# Patient Record
Sex: MALE | Race: OTHER | HISPANIC OR LATINO | URBAN - METROPOLITAN AREA
[De-identification: names, ages, dates, MRNs, and addresses within clinical notes are randomized per-mention and may not be internally consistent; named-entity substitution may affect disease eponyms.]

---

## 2021-05-26 ENCOUNTER — EMERGENCY (EMERGENCY)
Facility: HOSPITAL | Age: 21
LOS: 1 days | Discharge: HOME | End: 2021-05-26
Attending: INTERNAL MEDICINE | Admitting: INTERNAL MEDICINE
Payer: MEDICAID

## 2021-05-26 VITALS
WEIGHT: 184.97 LBS | DIASTOLIC BLOOD PRESSURE: 96 MMHG | OXYGEN SATURATION: 98 % | RESPIRATION RATE: 18 BRPM | HEIGHT: 75 IN | TEMPERATURE: 98 F | SYSTOLIC BLOOD PRESSURE: 144 MMHG | HEART RATE: 98 BPM

## 2021-05-26 LAB
ALBUMIN SERPL ELPH-MCNC: 4.8 G/DL — SIGNIFICANT CHANGE UP (ref 3.3–5)
ALP SERPL-CCNC: 137 U/L — HIGH (ref 40–120)
ALT FLD-CCNC: 37 U/L — SIGNIFICANT CHANGE UP (ref 10–45)
ANION GAP SERPL CALC-SCNC: 9 MMOL/L — SIGNIFICANT CHANGE UP (ref 5–17)
APPEARANCE UR: CLEAR — SIGNIFICANT CHANGE UP
AST SERPL-CCNC: 22 U/L — SIGNIFICANT CHANGE UP (ref 10–40)
BACTERIA # UR AUTO: ABNORMAL /HPF
BASOPHILS # BLD AUTO: 0.04 K/UL — SIGNIFICANT CHANGE UP (ref 0–0.2)
BASOPHILS NFR BLD AUTO: 0.6 % — SIGNIFICANT CHANGE UP (ref 0–2)
BILIRUB SERPL-MCNC: 0.4 MG/DL — SIGNIFICANT CHANGE UP (ref 0.2–1.2)
BILIRUB UR-MCNC: NEGATIVE — SIGNIFICANT CHANGE UP
BUN SERPL-MCNC: 18 MG/DL — SIGNIFICANT CHANGE UP (ref 7–23)
CALCIUM SERPL-MCNC: 9.4 MG/DL — SIGNIFICANT CHANGE UP (ref 8.4–10.5)
CHLORIDE SERPL-SCNC: 106 MMOL/L — SIGNIFICANT CHANGE UP (ref 96–108)
CO2 SERPL-SCNC: 29 MMOL/L — SIGNIFICANT CHANGE UP (ref 22–31)
COLOR SPEC: YELLOW — SIGNIFICANT CHANGE UP
CREAT SERPL-MCNC: 0.98 MG/DL — SIGNIFICANT CHANGE UP (ref 0.5–1.3)
DIFF PNL FLD: ABNORMAL
EOSINOPHIL # BLD AUTO: 0.22 K/UL — SIGNIFICANT CHANGE UP (ref 0–0.5)
EOSINOPHIL NFR BLD AUTO: 3.1 % — SIGNIFICANT CHANGE UP (ref 0–6)
EPI CELLS # UR: SIGNIFICANT CHANGE UP
GLUCOSE SERPL-MCNC: 105 MG/DL — HIGH (ref 70–99)
GLUCOSE UR QL: NEGATIVE — SIGNIFICANT CHANGE UP
HCT VFR BLD CALC: 48.8 % — SIGNIFICANT CHANGE UP (ref 39–50)
HGB BLD-MCNC: 17.3 G/DL — HIGH (ref 13–17)
IMM GRANULOCYTES NFR BLD AUTO: 0.1 % — SIGNIFICANT CHANGE UP (ref 0–1.5)
KETONES UR-MCNC: NEGATIVE — SIGNIFICANT CHANGE UP
LEUKOCYTE ESTERASE UR-ACNC: NEGATIVE — SIGNIFICANT CHANGE UP
LYMPHOCYTES # BLD AUTO: 2.95 K/UL — SIGNIFICANT CHANGE UP (ref 1–3.3)
LYMPHOCYTES # BLD AUTO: 41.7 % — SIGNIFICANT CHANGE UP (ref 13–44)
MCHC RBC-ENTMCNC: 30.6 PG — SIGNIFICANT CHANGE UP (ref 27–34)
MCHC RBC-ENTMCNC: 35.5 GM/DL — SIGNIFICANT CHANGE UP (ref 32–36)
MCV RBC AUTO: 86.2 FL — SIGNIFICANT CHANGE UP (ref 80–100)
MONOCYTES # BLD AUTO: 0.66 K/UL — SIGNIFICANT CHANGE UP (ref 0–0.9)
MONOCYTES NFR BLD AUTO: 9.3 % — SIGNIFICANT CHANGE UP (ref 2–14)
NEUTROPHILS # BLD AUTO: 3.2 K/UL — SIGNIFICANT CHANGE UP (ref 1.8–7.4)
NEUTROPHILS NFR BLD AUTO: 45.2 % — SIGNIFICANT CHANGE UP (ref 43–77)
NITRITE UR-MCNC: NEGATIVE — SIGNIFICANT CHANGE UP
NRBC # BLD: 0 /100 WBCS — SIGNIFICANT CHANGE UP (ref 0–0)
PH UR: 6 — SIGNIFICANT CHANGE UP (ref 5–8)
PLATELET # BLD AUTO: 193 K/UL — SIGNIFICANT CHANGE UP (ref 150–400)
POTASSIUM SERPL-MCNC: 4.4 MMOL/L — SIGNIFICANT CHANGE UP (ref 3.5–5.3)
POTASSIUM SERPL-SCNC: 4.4 MMOL/L — SIGNIFICANT CHANGE UP (ref 3.5–5.3)
PROT SERPL-MCNC: 9.3 G/DL — HIGH (ref 6–8.3)
PROT UR-MCNC: 30 MG/DL
RBC # BLD: 5.66 M/UL — SIGNIFICANT CHANGE UP (ref 4.2–5.8)
RBC # FLD: 12.1 % — SIGNIFICANT CHANGE UP (ref 10.3–14.5)
RBC CASTS # UR COMP ASSIST: SIGNIFICANT CHANGE UP /HPF (ref 0–4)
SODIUM SERPL-SCNC: 144 MMOL/L — SIGNIFICANT CHANGE UP (ref 135–145)
SP GR SPEC: 1.02 — SIGNIFICANT CHANGE UP (ref 1.01–1.02)
UROBILINOGEN FLD QL: NEGATIVE — SIGNIFICANT CHANGE UP
WBC # BLD: 7.08 K/UL — SIGNIFICANT CHANGE UP (ref 3.8–10.5)
WBC # FLD AUTO: 7.08 K/UL — SIGNIFICANT CHANGE UP (ref 3.8–10.5)
WBC UR QL: SIGNIFICANT CHANGE UP /HPF (ref 0–5)

## 2021-05-26 PROCEDURE — 85025 COMPLETE CBC W/AUTO DIFF WBC: CPT

## 2021-05-26 PROCEDURE — 87591 N.GONORRHOEAE DNA AMP PROB: CPT

## 2021-05-26 PROCEDURE — 99285 EMERGENCY DEPT VISIT HI MDM: CPT

## 2021-05-26 PROCEDURE — 81001 URINALYSIS AUTO W/SCOPE: CPT

## 2021-05-26 PROCEDURE — 80053 COMPREHEN METABOLIC PANEL: CPT

## 2021-05-26 PROCEDURE — 36415 COLL VENOUS BLD VENIPUNCTURE: CPT

## 2021-05-26 PROCEDURE — 76870 US EXAM SCROTUM: CPT

## 2021-05-26 PROCEDURE — 99284 EMERGENCY DEPT VISIT MOD MDM: CPT | Mod: 25

## 2021-05-26 PROCEDURE — 76870 US EXAM SCROTUM: CPT | Mod: 26

## 2021-05-26 RX ORDER — IBUPROFEN 200 MG
600 TABLET ORAL ONCE
Refills: 0 | Status: DISCONTINUED | OUTPATIENT
Start: 2021-05-26 | End: 2021-05-29
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
## 2021-05-26 NOTE — ED PROVIDER NOTE - CARE PROVIDER_API CALL
Derrick Avina  UROLOGY  [address] [place] [address], [address]  [place], [place] [id number]  Phone: [phone]  Fax: [phone]  Follow Up Time:
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
## 2021-05-26 NOTE — ED PROVIDER NOTE - NSFOLLOWUPINSTRUCTIONS_ED_ALL_ED_FT
Rest, drink plenty of fluids  Advance activity as tolerated  Continue all previously prescribed medications as directed  Follow up with your PMD 2-3 days- bring copies of your results  Return to the ER for worsening  motrin 200 mg 3 tab every six h for pain

## 2021-05-26 NOTE — ED PROVIDER NOTE - PATIENT PORTAL LINK FT
You can access the FollowMyHealth Patient Portal offered by [facility] by registering at the following website: [email or web address]. By joining [employer]’s FollowMyHealth portal, you will also be able to view your health information using other applications (apps) compatible with our system.

## 2021-05-27 LAB
N GONORRHOEA RRNA SPEC QL NAA+PROBE: SIGNIFICANT CHANGE UP
SPECIMEN SOURCE: SIGNIFICANT CHANGE UP

## 2021-08-18 ENCOUNTER — EMERGENCY (EMERGENCY)
Facility: HOSPITAL | Age: 21
LOS: 1 days | Discharge: HOME | End: 2021-08-18
Attending: EMERGENCY MEDICINE | Admitting: EMERGENCY MEDICINE
Payer: MEDICAID

## 2021-08-18 VITALS
SYSTOLIC BLOOD PRESSURE: 125 MMHG | RESPIRATION RATE: 17 BRPM | OXYGEN SATURATION: 100 % | WEIGHT: 181 LBS | TEMPERATURE: 98 F | HEIGHT: 75 IN | HEART RATE: 80 BPM | DIASTOLIC BLOOD PRESSURE: 68 MMHG

## 2021-08-18 PROBLEM — Z78.9 OTHER SPECIFIED HEALTH STATUS: Chronic | Status: ACTIVE | Noted: 2021-05-26

## 2021-08-18 LAB

## 2021-08-18 PROCEDURE — 99283 EMERGENCY DEPT VISIT LOW MDM: CPT

## 2021-08-18 PROCEDURE — 99284 EMERGENCY DEPT VISIT MOD MDM: CPT

## 2021-08-18 PROCEDURE — 0225U NFCT DS DNA&RNA 21 SARSCOV2: CPT

## 2021-08-18 PROCEDURE — 87651 STREP A DNA AMP PROBE: CPT

## 2021-08-18 PROCEDURE — 87798 DETECT AGENT NOS DNA AMP: CPT

## 2021-08-18 RX ORDER — IBUPROFEN 200 MG
600 TABLET ORAL ONCE
Refills: 0 | Status: COMPLETED | OUTPATIENT
Start: 2021-08-18 | End: 2021-08-18

## 2021-08-18 RX ORDER — FLUTICASONE PROPIONATE 50 MCG
1 SPRAY, SUSPENSION NASAL
Qty: 1 | Refills: 0
Start: 2021-08-18 | End: 2021-08-24

## 2021-08-18 RX ADMIN — Medication 600 MILLIGRAM(S): at 13:00

## 2021-08-18 RX ADMIN — Medication 600 MILLIGRAM(S): at 12:37

## 2021-08-18 NOTE — ED PROVIDER NOTE - THROAT FINDINGS
+ cobblestoning posterior pharynx with mild erythema. no abscess./no exudate/uvula midline/NO VESICLES/ULCERS/NO DROOLING/NO TONGUE ELEVATION/NO STRIDOR

## 2021-08-18 NOTE — ED PROVIDER NOTE - NSFOLLOWUPCLINICS_GEN_ALL_ED_FT
[facility]  Family Medicine  [address] [place] [id number]  Phone: [phone]  Fax:   Follow Up Time: 1-3 Days

## 2021-08-18 NOTE — ED PROVIDER NOTE - CLINICAL SUMMARY MEDICAL DECISION MAKING FREE TEXT BOX
21 yo male with no significant pmh presents to the ED c/o nasal congestion, sore throat and sinus pressure x 4 days. no known sick contacts. did not get covid vaccine. Denies fever, chills, change in voice, excessive drooling, ear pain, chest pain, sob, cough, abd pain, N/V, UE/LE weakness or paresthesias. nonsmoker. Patient didn't try taking any medications for these symptoms. PE: as above. A/P: covid/rvp swab, strep pcr, motrin, reassess. Juarez: 21 yo male with no significant pmh presents to the ED c/o nasal congestion, sore throat and sinus pressure x 4 days. no known sick contacts. did not get covid vaccine. Denies fever, chills, change in voice, excessive drooling, ear pain, chest pain, sob, cough, abd pain, N/V, UE/LE weakness or paresthesias. nonsmoker. Patient didn't try taking any medications for these symptoms. PE: as above. A/P: covid/rvp swab, strep pcr, motrin, reassess.

## 2021-08-18 NOTE — ED PROVIDER NOTE - PATIENT PORTAL LINK FT
You can access the FollowMyHealth Patient Portal offered by [facility] by registering at the following website: [email or web address]. By joining [employer]’s FollowMyHealth portal, you will also be able to view your health information using other applications (apps) compatible with our system.

## 2021-08-18 NOTE — ED ADULT NURSE NOTE - NSICDXPASTMEDICALHX_GEN_ALL_CORE_FT
PAST MEDICAL HISTORY:  No pertinent past medical history      PAST MEDICAL HISTORY:  No pertinent past medical history

## 2021-08-18 NOTE — ED PROVIDER NOTE - CARDIAC, MLM
Normal rate, regular rhythm.  Heart sounds S1, S2.  No murmurs, rubs or gallops.
pt reports is right handed

## 2021-08-18 NOTE — ED PROVIDER NOTE - OBJECTIVE STATEMENT
21 yo male with no significant pmh presents to the ED c/o nasal congestion, sore throat and sinus pressure x 4 days. no known sick contacts. did not get covid vaccine. Denies fever, chills, change in voice, excessive drooling, ear pain, chest pain, sob, cough, abd pain, N/V, UE/LE weakness or paresthesias. nonsmoker. Patient didn't try taking any medications for these symptoms.

## 2021-12-09 ENCOUNTER — EMERGENCY (EMERGENCY)
Facility: HOSPITAL | Age: 21
LOS: 1 days | Discharge: HOME | End: 2021-12-09
Attending: EMERGENCY MEDICINE | Admitting: EMERGENCY MEDICINE
Payer: SELF-PAY

## 2021-12-09 VITALS
SYSTOLIC BLOOD PRESSURE: 130 MMHG | HEIGHT: 72 IN | TEMPERATURE: 98 F | RESPIRATION RATE: 18 BRPM | OXYGEN SATURATION: 100 % | WEIGHT: 199.96 LBS | DIASTOLIC BLOOD PRESSURE: 70 MMHG | HEART RATE: 79 BPM

## 2021-12-09 PROCEDURE — 99283 EMERGENCY DEPT VISIT LOW MDM: CPT | Mod: 25

## 2021-12-09 PROCEDURE — 99284 EMERGENCY DEPT VISIT MOD MDM: CPT

## 2021-12-09 PROCEDURE — 96372 THER/PROPH/DIAG INJ SC/IM: CPT

## 2021-12-09 RX ORDER — LIDOCAINE 4 G/100G
1 CREAM TOPICAL
Qty: 30 | Refills: 0
Start: 2021-12-09 | End: 2022-01-07

## 2021-12-09 RX ORDER — CYCLOBENZAPRINE HYDROCHLORIDE 10 MG/1
1 TABLET, FILM COATED ORAL
Qty: 12 | Refills: 0
Start: 2021-12-09 | End: 2021-12-12

## 2021-12-09 RX ORDER — KETOROLAC TROMETHAMINE 30 MG/ML
30 SYRINGE (ML) INJECTION ONCE
Refills: 0 | Status: DISCONTINUED | OUTPATIENT
Start: 2021-12-09 | End: 2021-12-09

## 2021-12-09 RX ORDER — IBUPROFEN 200 MG
1 TABLET ORAL
Qty: 16 | Refills: 0
Start: 2021-12-09 | End: 2021-12-12

## 2021-12-09 RX ORDER — LIDOCAINE 4 G/100G
1 CREAM TOPICAL ONCE
Refills: 0 | Status: COMPLETED | OUTPATIENT
Start: 2021-12-09 | End: 2021-12-09

## 2021-12-09 RX ADMIN — LIDOCAINE 1 PATCH: 4 CREAM TOPICAL at 13:45

## 2021-12-09 RX ADMIN — Medication 30 MILLIGRAM(S): at 13:48

## 2021-12-09 NOTE — ED PROVIDER NOTE - PATIENT PORTAL LINK FT
You can access the FollowMyHealth Patient Portal offered by [facility] by registering at the following website: [email or web address]. By joining [employer]’s FollowMyHealth portal, you will also be able to view your health information using other applications (apps) compatible with our system.

## 2021-12-09 NOTE — ED PROVIDER NOTE - CLINICAL SUMMARY MEDICAL DECISION MAKING FREE TEXT BOX
Back pain to the left SI joint after "leg day" at the gym.   Leg day was yesterday.   Exam WNL except for spasm and tightness.   No bony tenderness.   No step off. Distal neurovasc intact.   Given NSAID and Lido patch. Discussed with  Markos #[id number].   Patient will take mscle relaxant when he gets home. He is driving.   Worsening, continued or ANY new concerning symptoms return to the emergency department.

## 2021-12-09 NOTE — ED PROVIDER NOTE - ATTENDING CONTRIBUTION TO CARE
Antolin with [facility] Velazquez. Back pain to the left SI joint after "leg day" at the gym.   Leg day was yesterday.   Exam WNL except for spasm and tightness.   No bony tenderness.   No step off. Distal neurovasc intact.   Given NSAID and Lido patch. Discussed with  Markos #[id number].   Patient will take mscle relaxant when he gets home. He is driving.   Worsening, continued or ANY new concerning symptoms return to the emergency department.    I performed a face to face bedside interview with patient regarding history of present illness, review of symptoms and past medical history. I completed an independent physical exam.  I have discussed the patient's plan of care with Physician Assistant (PA). I agree with note as stated above, having amended the EMR as needed to reflect my findings.   This includes History of Present Illness, HIV, Past Medical/Surgical/Family/Social History, Allergies and Home Medications, Review of Systems, Physical Exam, and any Progress Notes during the time I functioned as the attending physician for this patient.

## 2021-12-09 NOTE — ED PROVIDER NOTE - NSFOLLOWUPINSTRUCTIONS_ED_ALL_ED_FT
Distensión muscular    LO QUE NECESITA SABER:    ¿Qué es grant distensión muscular?Grant distensión muscular es grant torcedura, tirón o desgarre de un músculo o tendón. Un tendón es un tejido elástico suresh que conecta un músculo a un hueso.    ¿Qué causa grant distensión muscular?El estirar un músculo demasiado podría provocar grant distensión muscular. Grant distensión también podría ocurrir cuando se usa un músculo demasiado y sin descanso. Las distensiones musculares en la pierna son más comunes en personas que practican deportes, que corren, bailan y practican esquí acuático. Las distensiones en los músculos del abdomen podrían ocurrir al jugar voleyball, tenis, golf, beísbol y al practicar buceo. Las distensiones en la parte inferior de las espalda podrían ocurrir cuando se levantan objetos pesados o al practicar osbaldo kyleigh o gimnasia.    ¿Cuáles son los tipos de distensiones musculares?  •Grant distensión levetambién se le llama distensión de primer mary. [place] es un desgarre de unas cuantas fibras del músculo con un poco de inflamación. Usted podría tener muy poca o ninguna pérdida de fuerza muscular.      •Grant distensión moderadatambién se le llama distensión de edwin mary. Existe más daño en el músculo o en el tendón y está más débil de lo que estaba antes de la lesión.      •Grant distensión severatambién se le llama distensión de tercer mary. Karla desgarre corre por toda la longitud del músculo y lo incapacita para usarlo completamente.      ¿Qué aumenta mi riesgo de grant distensión muscular?  •Edad avanzada      •Fatiga muscular (cansancio)      •No hacer calentamiento antes del ejercicio      •Lesiones musculares pasadas o regresar a las actividades usuales antes de que hopkins lesión se ha curado      •Músculos tiesos, tensos y débiles      •Entrenar por más tiempo y mas lejos del tiempo o distancia usual      •Dificultad con los pies o que las piernas nedra de diferente longitud      ¿Cuáles son los signos y síntomas de grant distensión muscular?Los signos y síntomas de grant distensión muscular dependen de la severidad de la lesión en el músculo. Los signos y síntomas podrían o no aparecer rápidamente cuando ocurre la lesión. Usted podría tener emilia o más de los siguientes:  •Moretones en la piel en el área de la lesión muscular      •Dolor muscular, calambres o espasmos      •Poco movimiento muscular o músculo tieso, o pérdida de fuerza muscular      •Inflamación en el área de la lesión      •Dolor muscular que empeora con la actividad o dolor que se mueve o se extiende a otras áreas del cuerpo      •Crepitación (oleksandr crujiente o sensación de rechinido) al  hopkins músculo.      ¿Cómo se diagnostica grant distensión muscular?Hopkins médico le preguntará sobre enfermedades y lesiones que usted ha tenido en el pasado. Karla podría tocar y poner presión en partes de aston músculos. Podría doblar, estirar o  aston articulaciones de ciertas formas. También se le podría realizar alguno de los siguientes exámenes:  •Radiografía:[place] es grant imagen de los huesos y los tejidos del cuerpo. Las radiografías podrían ser realizadas para asegurarse de que usted no se quebró un hueso cuando ocurrió la distensión muscular.      •Imagen por resonancia magnética:A esta prueba también se le llama IRM. Loyd grant IRM, se teodora imágenes de aston músculos. Un IRM podría usarse para buscar desgarres u otras lesiones musculares. También podrían utilizarse para revisar las articulaciones, huesos o vasos sanguíneos. Es posible que le administren un colorante por la vena antes de que se tomen las imágenes. [place] ayuda a que las partes del cuerpo se van mejor. Informe al médico si usted es alérgico a los mariscos (langosta, cangrejo o camarón). La gente alérgica a los mariscos también podría ser alérgica a algunos colorantes.      •Tomografía computarizada:También llamado un escán TC. Grant máquina de tab x utiliza grant computadora para registrar imágenes de los brazos, piernas, espalda o abdomen. Se utiliza para revisar las lesiones musculares, huesos quebrados y vasos sanguíneos dañados.      •Ultrasonido:El ultrasonido utiliza ondas sonoras para mostrar imágenes de los músculos y tejidos en un monitor.      ¿Qué puedo hacer para ayudar a que la distensión muscular sane?  •De 3 a 7 días después de la lesión:Utilice [place], Hielo, Compresión y Elevación (DHCE) para ayudar a detener los moretones y disminuir el dolor y la inflamación.?Descanse:Descanse el músculo para permitir que la lesión sane. Cuando disminuya el dolor, comience a realizar movimientos normales y lentos. Para distensiones musculares leves y moderadas, usted debe descansar los músculos por lo menos 2 días. Sin embargo, si usted tiene grant distensión muscular severa, el descanso debe ser de 10 a 14 aileen. Es posible que usted necesite muletas para caminar si la distensión muscular está en las piernas o en la parte inferior del cuerpo.      ?Hielo:Coloque grant bolsa de hielo en el área lesionada. Coloque grant toalla entre el paquete de hielo y la piel. No coloque la bolsa de hielo directamente sobre la piel. Usted puede usar un paquete de chícharos congelados en vez de usar grant bolsa de hielo.      ?Compresión:Es posible que usted necesite envolver un vendaje elástico alrededor del área para reducir la inflamación. Estas deben estar lo suficientemente apretadas eleazar para sentir sostén. No lo apriete demasiado.      ?Elevación:Mantenga el músculo lesionado arriba del nivel del corazón si es posible. Por ejemplo, si usted tiene grant distensión en la parte inferior de la pierna, acuéstese y apoye la pierna sobre almohadas. [place] ayuda a disminuir el dolor y la inflamación.      •De 3 a 21 días después de la lesión:Comience lenta y regularmente a ejercitar el músculo con la distensión. [place] ayudará a que sane. Si siente dolor, disminuya la fuerza del ejercicio.      •De 1 a 6 semanas después de la lesión:Estire el músculo lesionado. Estírelo alrededor de 30 segundos. Linda esto 4 veces al día. Usted puede estirar el músculo hasta que sienta un jalón pequeño. Suspenda el estiramiento del músculo si siente dolor.      •De 2 semanas a 6 meses después de la lesión:La meta de esta fase es que usted regrese a la actividad que estaba haciendo antes de que ocurriera la lesión sin lesionar el músculo.      •De 3 semanas a 6 meses después de la lesión:Continúe estirando y fortaleciendo aston músculos para evitar grant lesión. Aumente el tiempo y la distancia del ejercicio lentamente. Usted aún podría tener signos y síntomas de distensión muscular 6 meses después de la lesión, incluso si usted realiza actividades para ayudar a sanarla. En karla lisha, es posible que usted necesite cirugía en el músculo.      ¿Cómo se trata grant distensión muscular?  •Medicamentos:?AINEs (analgésicos antiinflamatorios no esteroides):Karla medicamento es usado para disminuir el dolor y la inflamación. Se puede comprar sin receta. Los AINEs pueden provocar sangrado estomacal o problemas renales si no se teodora correctamente. Lisa siempre las etiquetas y siga las indicaciones antes de usar karla tipo de medicamentos. Usted debe usar karla medicamento solamente por 3 a 7 días después de que la lesión ocurrió.      ?Relajantes muscularesayudan a reducir dolor y espasmos musculares.      ?Medicamentos esteroideos:Hopkins médico podría recomendarle grant inyección con esteroides para disminuir el dolor y la inflamación.      ?Anestesia local:Esta se puede usar para entumecer el área por un periodo de tiempo corto. [place] se usa a menudo si usted tiene grant distensión muscular en la espalda.      •Fisioterapia:Un fisioterapeuta le puede enseñar ejercicios para ayudarle a mejorar el movimiento y la fuerza, y para disminuir el dolor.      •Cirugía:Los médicos podrían recomendarle grant cirugía si la distensión muscular no ha sanado después de 6 meses de tratamiento. Se puede hacer grant cirugía para drenar la margarette que se ha acumulado en hopkins músculo. Si hopkins tendón fue arrancado del hueso, se puede regresar a hopkins lugar con cirugía.      ¿Cómo se puede evitar grant distensión muscular?  •Siempre use el calzado apropiado cuando practique deportes:Reemplace frecuentemente aston zapatos viejos de correr por unos nuevos si usted es un eric. Use unas plantillas o soportes del arco para el calzado para corregir problemas con la pierna o el pie. Consulte con hopkins médico para más información sobre soportes de calzado.      •Linda ejercicios de calentamiento y enfriamiento:Linda ejercicios de estiramiento antes de hacer ejercicio o actividades deportivas. Estos ejercicios ayudan a soltar y disminuir el estrés en aston músculos. Después de aston ejercicios, enfríe aston músculos y estírese. No suspenda el ejercicio y descanse después sin haberse enfriado antes.      •Mantenga los músculos william al entrenar con ejercicios de fuerza:Ejercicios eleazar levantar pesas y de estiramiento ayudan a mantener aston músculos flexibles y william. Un fisioterapeuta o un entrenador podrían ayudarlo con estos ejercicios.      •Comience un programa de entrenamiento de ejercicios o de deportes lentamente:Siga el consejo de hopkins médico de cuándo empezar a ejercitarse. Aumente el tiempo, la distancia y la frecuencia de hopkins entrenamiento poco a poco. Aumentar la frecuencia del ejercicio repentinamente podría provocarle grant lesión muscular nuevamente.      ¿Cuándo raymon llamar a mi médico?  •Hopkins dolor o inflamación empeoran o no desaparecen.      •Usted tiene preguntas o inquietudes sobre hopkins cuidado o tratamiento.      ¿Cuándo raymon buscar atención inmediata?  •Usted repentinamente no siente o no puede  el músculo lesionado.          ACUERDOS SOBRE HOPKINS CUIDADO:    Usted tiene el derecho de ayudar a planear hopkins cuidado. Aprenda todo lo que pueda sobre hopkins condición y eleazar darle tratamiento. Discuta aston opciones de tratamiento con aston médicos para decidir el cuidado que usted desea recibir. Usted siempre tiene el derecho de rechazar el tratamiento.

## 2021-12-09 NOTE — ED PROVIDER NOTE - OBJECTIVE STATEMENT
21 yr old male with no pmhx presents with left buttock pain since this morning. Pt reports he did dead lifts yesterday and now has the pain. No other falls or injury. No fever, chills. pain non radiating.

## 2021-12-09 NOTE — ED PROVIDER NOTE - PHYSICAL EXAMINATION
slight spasm over left lumbar paraspinal area and left buttock area   spine- no bony tenderness   pt ambulating.

## 2022-06-01 ENCOUNTER — NON-APPOINTMENT (OUTPATIENT)
Age: 22
End: 2022-06-01

## 2022-09-08 ENCOUNTER — EMERGENCY (EMERGENCY)
Facility: HOSPITAL | Age: 22
LOS: 1 days | Discharge: HOME | End: 2022-09-08
Attending: EMERGENCY MEDICINE | Admitting: EMERGENCY MEDICINE
Payer: COMMERCIAL

## 2022-09-08 VITALS
DIASTOLIC BLOOD PRESSURE: 70 MMHG | HEART RATE: 80 BPM | SYSTOLIC BLOOD PRESSURE: 157 MMHG | OXYGEN SATURATION: 98 % | RESPIRATION RATE: 15 BRPM | TEMPERATURE: 98 F

## 2022-09-08 VITALS
HEIGHT: 72 IN | OXYGEN SATURATION: 100 % | DIASTOLIC BLOOD PRESSURE: 74 MMHG | TEMPERATURE: 97 F | WEIGHT: 199.96 LBS | RESPIRATION RATE: 16 BRPM | HEART RATE: 79 BPM | SYSTOLIC BLOOD PRESSURE: 163 MMHG

## 2022-09-08 PROCEDURE — 72125 CT NECK SPINE W/O DYE: CPT | Mod: MA

## 2022-09-08 PROCEDURE — 99284 EMERGENCY DEPT VISIT MOD MDM: CPT | Mod: 25

## 2022-09-08 PROCEDURE — 72125 CT NECK SPINE W/O DYE: CPT | Mod: 26,MA

## 2022-09-08 PROCEDURE — 99284 EMERGENCY DEPT VISIT MOD MDM: CPT

## 2022-09-08 RX ORDER — ACETAMINOPHEN 500 MG
650 TABLET ORAL ONCE
Refills: 0 | Status: COMPLETED | OUTPATIENT
Start: 2022-09-08 | End: 2022-09-08

## 2022-09-08 RX ADMIN — Medication 650 MILLIGRAM(S): at 22:11
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
## 2022-09-08 NOTE — ED PROVIDER NOTE - NS ED ATTENDING STATEMENT MOD
Attending Only
This was a shared visit with the HANS. I reviewed and verified the documentation and independently performed the documented:

## 2022-09-08 NOTE — ED PROVIDER NOTE - NSFOLLOWUPINSTRUCTIONS_ED_ALL_ED_FT
1. Motrin 400mg every 6 hours on a full stomach as needed for pain  2. Expect to be more sore tomorrow than today  3. Heat pack to affected area as needed for pain  4. Follow up with your doctor in 1-2 days  5. Return to the ED for pain increasing drastically, weakness or numbness in one part of the body, chest pain, shortness of breath or any concerns

## 2022-09-08 NOTE — ED PROVIDER NOTE - PATIENT PORTAL LINK FT
You can access the FollowMyHealth Patient Portal offered by [facility] by registering at the following website: [email or web address]. By joining [employer]’s FollowMyHealth portal, you will also be able to view your health information using other applications (apps) compatible with our system.

## 2022-09-21 ENCOUNTER — APPOINTMENT (OUTPATIENT)
Dept: FAMILY MEDICINE | Facility: CLINIC | Age: 22
End: 2022-09-21

## 2022-09-21 PROBLEM — Z00.00 ENCOUNTER FOR PREVENTIVE HEALTH EXAMINATION: Status: ACTIVE | Noted: 2022-09-21

## 2022-11-07 ENCOUNTER — APPOINTMENT (OUTPATIENT)
Dept: FAMILY MEDICINE | Facility: CLINIC | Age: 22
End: 2022-11-07

## 2022-11-07 ENCOUNTER — NON-APPOINTMENT (OUTPATIENT)
Age: 22
End: 2022-11-07

## 2022-11-07 VITALS
TEMPERATURE: 96.8 F | HEART RATE: 75 BPM | BODY MASS INDEX: 25.27 KG/M2 | WEIGHT: 199 LBS | HEIGHT: 74.41 IN | SYSTOLIC BLOOD PRESSURE: 128 MMHG | OXYGEN SATURATION: 99 % | RESPIRATION RATE: 16 BRPM | DIASTOLIC BLOOD PRESSURE: 82 MMHG

## 2022-11-07 DIAGNOSIS — J45.909 UNSPECIFIED ASTHMA, UNCOMPLICATED: ICD-10-CM

## 2022-11-07 PROCEDURE — 99214 OFFICE O/P EST MOD 30 MIN: CPT | Mod: 25

## 2022-11-07 PROCEDURE — 36415 COLL VENOUS BLD VENIPUNCTURE: CPT

## 2022-11-07 PROCEDURE — 93000 ELECTROCARDIOGRAM COMPLETE: CPT
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
## 2022-11-08 NOTE — INTERPRETER SERVICES
[[employer] ] : provided by [employer]   [Interpreters_IDNumber] : [id number] [Interpreters_FullName] : Markos Rodriguez  [TWNoteComboBox1] : Citizen of the [place]

## 2022-11-08 NOTE — HISTORY OF PRESENT ILLNESS
[FreeTextEntry8] : Presents with chronic left  testicular pain. Reports pain started after getting hit in his pelvic region by a soccer ball while playing soccer one year ago. \par For the past year he has been suffering from left testicular pain and swelling that is worse with standing and relieved with sitting. Pain waxes and wanes but at times can be a 10/10 sharp pain. \par \par Was seen at [facility] about 6 months ago for current symptoms. He reports that they informed him that it is related to a comprised blood vessel and informed him to fu with urology. Due to lack of insurance he did not follow up. \par \par Denies luts, penile discharge, or suprapubic pain.\par Currently not sexually active over past year \par

## 2022-11-08 NOTE — PHYSICAL EXAM
[Urethral Meatus] : meatus normal [Penis Abnormality] : normal uncircumcised penis [Epididymis] : the epididymides were normal [Testes Tenderness] : no tenderness of the testes [Normal] : no CVA or spinal tenderness

## 2022-11-10 ENCOUNTER — APPOINTMENT (OUTPATIENT)
Dept: UROLOGY | Facility: CLINIC | Age: 22
End: 2022-11-10

## 2022-11-10 LAB
APPEARANCE: CLEAR
BACTERIA: NEGATIVE
BILIRUBIN URINE: NEGATIVE
BLOOD URINE: NEGATIVE
C TRACH RRNA SPEC QL NAA+PROBE: NOT DETECTED
COLOR: COLORLESS
GLUCOSE QUALITATIVE U: NEGATIVE
HCV RNA SERPL NAA+PROBE-LOG IU: NOT DETECTED LOGIU/ML
HEPC RNA INTERP: NOT DETECTED
HIV1+2 AB SPEC QL IA.RAPID: NONREACTIVE
HYALINE CASTS: 0 /LPF
KETONES URINE: NEGATIVE
LEUKOCYTE ESTERASE URINE: NEGATIVE
MICROSCOPIC-UA: NORMAL
N GONORRHOEA RRNA SPEC QL NAA+PROBE: NOT DETECTED
NITRITE URINE: NEGATIVE
PH URINE: 6.5
PROTEIN URINE: NEGATIVE
RED BLOOD CELLS URINE: 1 /HPF
SOURCE AMPLIFICATION: NORMAL
SPECIFIC GRAVITY URINE: 1.01
SQUAMOUS EPITHELIAL CELLS: 0 /HPF
T PALLIDUM AB SER QL IA: NEGATIVE
UROBILINOGEN URINE: NORMAL
WHITE BLOOD CELLS URINE: 0 /HPF

## 2022-11-11 ENCOUNTER — APPOINTMENT (OUTPATIENT)
Dept: UROLOGY | Facility: CLINIC | Age: 22
End: 2022-11-11
Payer: MEDICAID

## 2022-11-11 VITALS — HEART RATE: 76 BPM | DIASTOLIC BLOOD PRESSURE: 81 MMHG | SYSTOLIC BLOOD PRESSURE: 138 MMHG

## 2022-11-11 DIAGNOSIS — N50.812 LEFT TESTICULAR PAIN: ICD-10-CM

## 2022-11-11 PROCEDURE — 99203 OFFICE O/P NEW LOW 30 MIN: CPT

## 2022-11-11 PROCEDURE — 99213 OFFICE O/P EST LOW 20 MIN: CPT

## 2022-11-11 RX ORDER — MELOXICAM 7.5 MG/1
7.5 TABLET ORAL
Qty: 60 | Refills: 1 | Status: ACTIVE | COMMUNITY
Start: 2022-11-11 | End: 1900-01-01

## 2022-11-14 LAB
ALBUMIN SERPL ELPH-MCNC: 5.2 G/DL
ALP BLD-CCNC: 105 U/L
ALT SERPL-CCNC: 29 U/L
ANION GAP SERPL CALC-SCNC: 13 MMOL/L
APPEARANCE: CLEAR
AST SERPL-CCNC: 24 U/L
BACTERIA UR CULT: NORMAL
BACTERIA: NEGATIVE
BILIRUB SERPL-MCNC: 0.5 MG/DL
BILIRUBIN URINE: NEGATIVE
BLOOD URINE: NEGATIVE
BUN SERPL-MCNC: 22 MG/DL
C TRACH RRNA SPEC QL NAA+PROBE: NOT DETECTED
CALCIUM SERPL-MCNC: 9.8 MG/DL
CHLORIDE SERPL-SCNC: 100 MMOL/L
CO2 SERPL-SCNC: 24 MMOL/L
COLOR: NORMAL
CREAT SERPL-MCNC: 0.83 MG/DL
EGFR: 127 ML/MIN/1.73M2
GLUCOSE QUALITATIVE U: NEGATIVE
GLUCOSE SERPL-MCNC: 98 MG/DL
HSV 1+2 IGG SER IA-IMP: NEGATIVE
HSV 1+2 IGG SER IA-IMP: NEGATIVE
HSV1 IGG SER QL: 0.3 INDEX
HSV2 IGG SER QL: 0.08 INDEX
HYALINE CASTS: 0 /LPF
KETONES URINE: NEGATIVE
LEUKOCYTE ESTERASE URINE: NEGATIVE
MICROSCOPIC-UA: NORMAL
N GONORRHOEA RRNA SPEC QL NAA+PROBE: NOT DETECTED
NITRITE URINE: NEGATIVE
PH URINE: 6.5
POTASSIUM SERPL-SCNC: 4 MMOL/L
PROT SERPL-MCNC: 8.3 G/DL
PROTEIN URINE: NEGATIVE
RED BLOOD CELLS URINE: 0 /HPF
SODIUM SERPL-SCNC: 137 MMOL/L
SOURCE AMPLIFICATION: NORMAL
SOURCE AMPLIFICATION: NORMAL
SPECIFIC GRAVITY URINE: 1.02
SQUAMOUS EPITHELIAL CELLS: 0 /HPF
T PALLIDUM AB SER QL IA: NEGATIVE
T VAGINALIS RRNA SPEC QL NAA+PROBE: NOT DETECTED
UROBILINOGEN URINE: NORMAL
WHITE BLOOD CELLS URINE: 0 /HPF

## 2022-11-16 ENCOUNTER — APPOINTMENT (OUTPATIENT)
Dept: ULTRASOUND IMAGING | Facility: CLINIC | Age: 22
End: 2022-11-16

## 2022-11-16 ENCOUNTER — OUTPATIENT (OUTPATIENT)
Dept: OUTPATIENT SERVICES | Facility: HOSPITAL | Age: 22
LOS: 1 days | End: 2022-11-16
Payer: MEDICAID

## 2022-11-16 DIAGNOSIS — N50.812 LEFT TESTICULAR PAIN: ICD-10-CM

## 2022-11-16 PROCEDURE — 76870 US EXAM SCROTUM: CPT | Mod: 26

## 2022-11-16 PROCEDURE — 76870 US EXAM SCROTUM: CPT

## 2022-11-17 LAB
HSV1 IGM SER QL: NEGATIVE
HSV2 AB FLD-ACNC: NEGATIVE

## 2022-12-02 ENCOUNTER — EMERGENCY (EMERGENCY)
Facility: HOSPITAL | Age: 22
LOS: 1 days | Discharge: HOME | End: 2022-12-02
Attending: INTERNAL MEDICINE | Admitting: INTERNAL MEDICINE
Payer: MEDICAID

## 2022-12-02 VITALS
WEIGHT: 202.83 LBS | RESPIRATION RATE: 19 BRPM | DIASTOLIC BLOOD PRESSURE: 76 MMHG | TEMPERATURE: 98 F | HEIGHT: 74 IN | OXYGEN SATURATION: 98 % | HEART RATE: 68 BPM | SYSTOLIC BLOOD PRESSURE: 157 MMHG

## 2022-12-02 PROCEDURE — 99283 EMERGENCY DEPT VISIT LOW MDM: CPT

## 2022-12-02 NOTE — ED ADULT TRIAGE NOTE - CHIEF COMPLAINT QUOTE
Patient comes in with left eye redness and pain that started on friday morning.  No blurred vision ,double vision, dizziness or SOB. Alert and oriented x 4.

## 2022-12-03 VITALS
HEART RATE: 72 BPM | RESPIRATION RATE: 19 BRPM | SYSTOLIC BLOOD PRESSURE: 134 MMHG | OXYGEN SATURATION: 98 % | TEMPERATURE: 98 F | DIASTOLIC BLOOD PRESSURE: 78 MMHG

## 2022-12-03 PROCEDURE — 99283 EMERGENCY DEPT VISIT LOW MDM: CPT

## 2022-12-03 RX ORDER — TOBRAMYCIN 0.3 %
1 DROPS OPHTHALMIC (EYE) ONCE
Refills: 0 | Status: COMPLETED | OUTPATIENT
Start: 2022-12-03 | End: 2022-12-03

## 2022-12-03 RX ORDER — ERYTHROMYCIN BASE 5 MG/GRAM
1 OINTMENT (GRAM) OPHTHALMIC (EYE)
Qty: 1 | Refills: 0
Start: 2022-12-03 | End: 2022-12-12

## 2022-12-03 RX ORDER — TOBRAMYCIN 0.3 %
1 DROPS OPHTHALMIC (EYE)
Qty: 1 | Refills: 0
Start: 2022-12-03 | End: 2022-12-09

## 2022-12-03 RX ADMIN — Medication 1 DROP(S): at 01:27

## 2022-12-03 NOTE — ED PROVIDER NOTE - CARE PROVIDER_API CALL
Oh Tran)  Ophthalmology  [address] [place] [id number]  Phone: [phone]  Fax: [phone]  Follow Up Time:

## 2022-12-03 NOTE — ED PROVIDER NOTE - PHYSICAL EXAMINATION
General:     NAD, well-nourished, well-appearing  Head:     NC/AT, EOMI, oral mucosa moist  HEENT L eye erythematous conjunctiva, cornea clear no flourescin uptake  Neck:     trachea midline  Lungs:     CTA b/l, no w/r/r  CVS:     S1S2, RRR, no m/g/r  Abd:     +BS, s/nt/nd, no organomegaly  Ext:    2+ radial and pedal pulses, no c/c/e  Neuro: AAOx3, no sensory/motor deficits

## 2022-12-03 NOTE — ED ADULT NURSE NOTE - OBJECTIVE STATEMENT
patient presents to ED with complaint of left eye redness and pain that started friday morning. No nausea, vomiting, dizziness, blurred vision or double vision. Alert and oriented x 4.

## 2022-12-03 NOTE — ED PROVIDER NOTE - PATIENT PORTAL LINK FT
You can access the FollowMyHealth Patient Portal offered by [facility] by registering at the following website: [email or web address]. By joining [employer]’s FollowMyHealth portal, you will also be able to view your health information using other applications (apps) compatible with our system.

## 2023-01-23 ENCOUNTER — APPOINTMENT (OUTPATIENT)
Dept: UROLOGY | Facility: CLINIC | Age: 23
End: 2023-01-23

## 2023-10-16 ENCOUNTER — OUTPATIENT (OUTPATIENT)
Dept: OUTPATIENT SERVICES | Facility: HOSPITAL | Age: 23
LOS: 1 days | End: 2023-10-16
Payer: COMMERCIAL

## 2023-10-16 ENCOUNTER — APPOINTMENT (OUTPATIENT)
Dept: RADIOLOGY | Facility: HOSPITAL | Age: 23
End: 2023-10-16
Payer: MEDICAID

## 2023-10-16 DIAGNOSIS — Z00.8 ENCOUNTER FOR OTHER GENERAL EXAMINATION: ICD-10-CM

## 2023-10-16 PROCEDURE — 73562 X-RAY EXAM OF KNEE 3: CPT

## 2023-10-16 PROCEDURE — 73562 X-RAY EXAM OF KNEE 3: CPT | Mod: 26,RT

## 2024-04-09 NOTE — ED PROVIDER NOTE - GENITOURINARY TESTICLE
Instructions: This plan will send the code FBSE to the PM system.  DO NOT or CHANGE the price. left abnormal... Price (Do Not Change): 0.00 Detail Level: Simple

## 2024-10-28 NOTE — ED ADULT NURSE NOTE - NS PRO PASSIVE SMOKE EXP
Detail Level: Detailed Show Aperture Variable?: Yes Post-Care Instructions: I reviewed with the patient in detail post-care instructions. Patient is to wear sunprotection, and avoid picking at any of the treated lesions. Pt may apply Vaseline to crusted or scabbing areas. Duration Of Freeze Thaw-Cycle (Seconds): 3 Render Note In Bullet Format When Appropriate: No Application Tool (Optional): Liquid Nitrogen Sprayer Consent: The patient's consent was obtained including but not limited to risks of crusting, scabbing, blistering, scarring, darker or lighter pigmentary change, recurrence, incomplete removal and infection. Number Of Freeze-Thaw Cycles: 3 freeze-thaw cycles No

## 2025-08-06 ENCOUNTER — NON-APPOINTMENT (OUTPATIENT)
Age: 25
End: 2025-08-06

## 2025-08-06 ENCOUNTER — APPOINTMENT (OUTPATIENT)
Dept: ORTHOPEDIC SURGERY | Facility: CLINIC | Age: 25
End: 2025-08-06
Payer: COMMERCIAL

## 2025-08-06 VITALS — WEIGHT: 225 LBS | HEIGHT: 74 IN | BODY MASS INDEX: 28.88 KG/M2

## 2025-08-06 DIAGNOSIS — M54.50 LOW BACK PAIN, UNSPECIFIED: ICD-10-CM

## 2025-08-06 PROCEDURE — 72100 X-RAY EXAM L-S SPINE 2/3 VWS: CPT

## 2025-08-06 PROCEDURE — 99203 OFFICE O/P NEW LOW 30 MIN: CPT

## 2025-08-07 ENCOUNTER — OUTPATIENT (OUTPATIENT)
Dept: OUTPATIENT SERVICES | Facility: HOSPITAL | Age: 25
LOS: 1 days | End: 2025-08-07
Payer: COMMERCIAL

## 2025-08-07 ENCOUNTER — APPOINTMENT (OUTPATIENT)
Dept: MRI IMAGING | Facility: HOSPITAL | Age: 25
End: 2025-08-07
Payer: COMMERCIAL

## 2025-08-07 DIAGNOSIS — M54.50 LOW BACK PAIN, UNSPECIFIED: ICD-10-CM

## 2025-08-07 PROCEDURE — 72148 MRI LUMBAR SPINE W/O DYE: CPT

## 2025-08-07 PROCEDURE — 72148 MRI LUMBAR SPINE W/O DYE: CPT | Mod: 26

## 2025-08-18 ENCOUNTER — APPOINTMENT (OUTPATIENT)
Dept: ORTHOPEDIC SURGERY | Facility: CLINIC | Age: 25
End: 2025-08-18
Payer: COMMERCIAL

## 2025-08-18 VITALS — BODY MASS INDEX: 28.88 KG/M2 | HEIGHT: 74 IN | WEIGHT: 225 LBS

## 2025-08-18 PROBLEM — M54.16 LUMBAR RADICULOPATHY: Status: ACTIVE | Noted: 2025-08-06

## 2025-08-18 PROBLEM — M51.26 LUMBAR HERNIATED DISC: Status: ACTIVE | Noted: 2025-08-18

## 2025-08-18 PROCEDURE — 99214 OFFICE O/P EST MOD 30 MIN: CPT

## 2025-08-21 ENCOUNTER — APPOINTMENT (OUTPATIENT)
Dept: PHYSICAL MEDICINE AND REHAB | Facility: CLINIC | Age: 25
End: 2025-08-21

## 2025-08-21 VITALS
HEIGHT: 74 IN | BODY MASS INDEX: 27.34 KG/M2 | HEART RATE: 87 BPM | DIASTOLIC BLOOD PRESSURE: 83 MMHG | WEIGHT: 213 LBS | SYSTOLIC BLOOD PRESSURE: 149 MMHG

## 2025-08-21 DIAGNOSIS — M51.26 OTHER INTERVERTEBRAL DISC DISPLACEMENT, LUMBAR REGION: ICD-10-CM

## 2025-08-21 PROCEDURE — 99204 OFFICE O/P NEW MOD 45 MIN: CPT

## 2025-08-21 RX ORDER — NAPROXEN 500 MG/1
500 TABLET ORAL
Qty: 60 | Refills: 0 | Status: ACTIVE | COMMUNITY
Start: 2025-08-21 | End: 1900-01-01

## 2025-08-21 RX ORDER — GABAPENTIN 300 MG/1
300 CAPSULE ORAL
Qty: 60 | Refills: 0 | Status: ACTIVE | COMMUNITY
Start: 2025-08-21 | End: 1900-01-01

## 2025-08-23 ENCOUNTER — EMERGENCY (EMERGENCY)
Facility: HOSPITAL | Age: 25
LOS: 1 days | End: 2025-08-23
Attending: INTERNAL MEDICINE | Admitting: INTERNAL MEDICINE
Payer: COMMERCIAL

## 2025-08-23 VITALS
TEMPERATURE: 98 F | DIASTOLIC BLOOD PRESSURE: 91 MMHG | SYSTOLIC BLOOD PRESSURE: 144 MMHG | HEIGHT: 74 IN | WEIGHT: 220.02 LBS | HEART RATE: 128 BPM | OXYGEN SATURATION: 99 % | RESPIRATION RATE: 23 BRPM

## 2025-08-23 PROCEDURE — 99284 EMERGENCY DEPT VISIT MOD MDM: CPT

## 2025-08-23 PROCEDURE — 96372 THER/PROPH/DIAG INJ SC/IM: CPT

## 2025-08-23 PROCEDURE — 99284 EMERGENCY DEPT VISIT MOD MDM: CPT | Mod: 25

## 2025-08-23 RX ORDER — OXYCODONE HYDROCHLORIDE AND ACETAMINOPHEN 10; 325 MG/1; MG/1
1 TABLET ORAL
Qty: 12 | Refills: 0
Start: 2025-08-23 | End: 2025-08-25

## 2025-08-23 RX ORDER — LIDOCAINE HYDROCHLORIDE 20 MG/ML
1 JELLY TOPICAL ONCE
Refills: 0 | Status: COMPLETED | OUTPATIENT
Start: 2025-08-23 | End: 2025-08-23

## 2025-08-23 RX ORDER — LIDOCAINE HCL/PF 10 MG/ML
10 VIAL (ML) INJECTION ONCE
Refills: 0 | Status: COMPLETED | OUTPATIENT
Start: 2025-08-23 | End: 2025-08-23

## 2025-08-23 RX ORDER — DIAZEPAM 5 MG/1
5 TABLET ORAL ONCE
Refills: 0 | Status: DISCONTINUED | OUTPATIENT
Start: 2025-08-23 | End: 2025-08-23

## 2025-08-23 RX ORDER — IBUPROFEN 200 MG
1 TABLET ORAL
Qty: 40 | Refills: 0
Start: 2025-08-23 | End: 2025-09-01

## 2025-08-23 RX ORDER — KETOROLAC TROMETHAMINE 30 MG/ML
60 INJECTION, SOLUTION INTRAMUSCULAR; INTRAVENOUS ONCE
Refills: 0 | Status: DISCONTINUED | OUTPATIENT
Start: 2025-08-23 | End: 2025-08-23

## 2025-08-23 RX ORDER — CYCLOBENZAPRINE HYDROCHLORIDE 15 MG/1
1 CAPSULE, EXTENDED RELEASE ORAL
Qty: 30 | Refills: 0
Start: 2025-08-23 | End: 2025-09-01

## 2025-08-23 RX ADMIN — KETOROLAC TROMETHAMINE 60 MILLIGRAM(S): 30 INJECTION, SOLUTION INTRAMUSCULAR; INTRAVENOUS at 14:22

## 2025-08-23 RX ADMIN — DIAZEPAM 5 MILLIGRAM(S): 5 TABLET ORAL at 14:20

## 2025-08-23 RX ADMIN — Medication 10 MILLILITER(S): at 14:26

## 2025-08-23 RX ADMIN — LIDOCAINE HYDROCHLORIDE 1 PATCH: 20 JELLY TOPICAL at 14:21

## 2025-08-23 RX ADMIN — KETOROLAC TROMETHAMINE 60 MILLIGRAM(S): 30 INJECTION, SOLUTION INTRAMUSCULAR; INTRAVENOUS at 15:38

## 2025-08-26 ENCOUNTER — TRANSCRIPTION ENCOUNTER (OUTPATIENT)
Age: 25
End: 2025-08-26

## 2025-08-26 ENCOUNTER — APPOINTMENT (OUTPATIENT)
Dept: FAMILY MEDICINE | Facility: CLINIC | Age: 25
End: 2025-08-26
Payer: COMMERCIAL

## 2025-08-26 VITALS
BODY MASS INDEX: 27.34 KG/M2 | HEIGHT: 74 IN | TEMPERATURE: 98.1 F | OXYGEN SATURATION: 99 % | WEIGHT: 213 LBS | RESPIRATION RATE: 16 BRPM | DIASTOLIC BLOOD PRESSURE: 88 MMHG | SYSTOLIC BLOOD PRESSURE: 137 MMHG | HEART RATE: 112 BPM

## 2025-08-26 DIAGNOSIS — M54.50 LOW BACK PAIN, UNSPECIFIED: ICD-10-CM

## 2025-08-26 DIAGNOSIS — M54.16 RADICULOPATHY, LUMBAR REGION: ICD-10-CM

## 2025-08-26 PROCEDURE — 99214 OFFICE O/P EST MOD 30 MIN: CPT

## 2025-08-26 PROCEDURE — G2211 COMPLEX E/M VISIT ADD ON: CPT | Mod: NC

## 2025-08-26 RX ORDER — OXYCODONE 5 MG/1
5 TABLET ORAL EVERY 6 HOURS
Qty: 28 | Refills: 0 | Status: ACTIVE | COMMUNITY
Start: 2025-08-26 | End: 1900-01-01